# Patient Record
Sex: FEMALE | Race: WHITE | Employment: UNEMPLOYED | URBAN - METROPOLITAN AREA
[De-identification: names, ages, dates, MRNs, and addresses within clinical notes are randomized per-mention and may not be internally consistent; named-entity substitution may affect disease eponyms.]

---

## 2023-12-30 ENCOUNTER — HOSPITAL ENCOUNTER (OUTPATIENT)
Age: 58
Discharge: HOME | End: 2023-12-30
Payer: OTHER GOVERNMENT

## 2023-12-30 VITALS
SYSTOLIC BLOOD PRESSURE: 116 MMHG | DIASTOLIC BLOOD PRESSURE: 81 MMHG | OXYGEN SATURATION: 98 % | RESPIRATION RATE: 18 BRPM | TEMPERATURE: 98 F | WEIGHT: 200 LBS | HEART RATE: 91 BPM

## 2023-12-30 DIAGNOSIS — R09.82 POST-NASAL DRIP: Primary | ICD-10-CM

## 2023-12-30 DIAGNOSIS — R05.1 ACUTE COUGH: ICD-10-CM

## 2023-12-30 LAB
POCT INFLUENZA A: NEGATIVE
POCT INFLUENZA B: NEGATIVE
SARS-COV-2 RNA RESP QL NAA+PROBE: NOT DETECTED

## 2023-12-30 RX ORDER — LISINOPRIL AND HYDROCHLOROTHIAZIDE 25; 20 MG/1; MG/1
1 TABLET ORAL DAILY
COMMUNITY
Start: 2023-11-10

## 2023-12-30 RX ORDER — ESTRADIOL 0.1 MG/G
1 CREAM VAGINAL DAILY
COMMUNITY
Start: 2022-08-24

## 2023-12-30 NOTE — DISCHARGE INSTRUCTIONS
Drink plenty fluids  During the day, you may use Mucinex D one tab every 12 hours    Nasal sprays including Flonase Astepro or Nasonex may help with postnasal drip cough  Start taking a antihistamine daily, Zyrtec, Xyzal, Allegra or Claritin  Follow-up with your family doctor in 2-3 days if no better  Return to ED for any worsening or persisting symptoms, shortness of breath, chest pain, dizziness, fever.

## 2023-12-30 NOTE — ED INITIAL ASSESSMENT (HPI)
Pt presents with c/o cough and post nasal drip that started on Wednesday. States on Thursday pt had nausea dn vomiting. P states at  am she got up to vomit. States she stood up straight there after and passed out. States her son woke her up shortly after and she went to bed. Has facial bruising around her eyes, forehead and nose. States she believes she hit the toilet when she passed out. Pt concerned for covid or flu.